# Patient Record
Sex: FEMALE | URBAN - METROPOLITAN AREA
[De-identification: names, ages, dates, MRNs, and addresses within clinical notes are randomized per-mention and may not be internally consistent; named-entity substitution may affect disease eponyms.]

---

## 2021-02-03 ENCOUNTER — APPOINTMENT (RX ONLY)
Dept: URBAN - METROPOLITAN AREA CLINIC 55 | Facility: CLINIC | Age: 71
Setting detail: DERMATOLOGY
End: 2021-02-03

## 2021-02-03 DIAGNOSIS — Z41.9 ENCOUNTER FOR PROCEDURE FOR PURPOSES OTHER THAN REMEDYING HEALTH STATE, UNSPECIFIED: ICD-10-CM

## 2021-02-03 PROCEDURE — ? DYSPORT

## 2021-02-03 NOTE — PROCEDURE: DYSPORT
Right Periorbital Skin Units: 0
Show Additional Area 1: Yes
Expiration Date (Month Year): 9/30/2021
Show Right And Left Pupillary Line Units: No
Post-Care Instructions: After care instructions were provided to the patient.
Consent: Written consent was obtained.
Additional Area 1 Location: chin
Dilution (U/0.1 Cc): 10
Glabellar Complex Units: 25
Periorbital Skin Units: 52.5
Price (Use Numbers Only, No Special Characters Or $): 398
Depressor Anguli Oris Units: 5
Lot #: [id number]
Detail Level: Simple

## 2021-02-19 ENCOUNTER — APPOINTMENT (RX ONLY)
Dept: URBAN - METROPOLITAN AREA CLINIC 55 | Facility: CLINIC | Age: 71
Setting detail: DERMATOLOGY
End: 2021-02-19

## 2021-02-19 DIAGNOSIS — Z41.9 ENCOUNTER FOR PROCEDURE FOR PURPOSES OTHER THAN REMEDYING HEALTH STATE, UNSPECIFIED: ICD-10-CM

## 2021-02-19 PROCEDURE — ? FILLERS

## 2021-02-19 NOTE — PROCEDURE: FILLERS
Jawline Filler Volume In Cc: 0
Lot #: [id number]
Include Cannula Information In Note?: Yes
Include Cannula Size?: 27G
Include Cannula Information In Note?: No
Anesthesia Type: 1% lidocaine with epinephrine
Anesthesia Volume In Cc: 0.5
Consent: Written consent was obtained.
Post-Care Instructions: After care instructions were provided to the patient.
Filler: Juvederm Ultra Plus XC
Expiration Date (Month Year): 8/6/2021
Detail Level: Simple
Map Statment: See Attach Map for Complete Details
Lot #: [id number]
Expiration Date (Month Year): 1/31/2022

## 2021-05-24 ENCOUNTER — APPOINTMENT (RX ONLY)
Dept: URBAN - METROPOLITAN AREA CLINIC 55 | Facility: CLINIC | Age: 71
Setting detail: DERMATOLOGY
End: 2021-05-24

## 2021-05-24 DIAGNOSIS — Z41.9 ENCOUNTER FOR PROCEDURE FOR PURPOSES OTHER THAN REMEDYING HEALTH STATE, UNSPECIFIED: ICD-10-CM

## 2021-05-24 PROCEDURE — ? DYSPORT

## 2021-05-24 NOTE — PROCEDURE: DYSPORT
Show Ucl Units: No
Show Lateral Platysmal Band Units: Yes
Additional Area 4 Units: 0
Periorbital Skin Units: 52.5
Depressor Anguli Oris Units: 5
Price (Use Numbers Only, No Special Characters Or $): 398
Additional Area 1 Location: chin
Lot #: [id number]
Post-Care Instructions: After care instructions were provided to the patient.
Dilution (U/0.1 Cc): 10
Glabellar Complex Units: 25
Consent: Written consent was obtained.
Expiration Date (Month Year): 01/2022
Detail Level: Simple

## 2021-06-30 ENCOUNTER — APPOINTMENT (RX ONLY)
Dept: URBAN - METROPOLITAN AREA CLINIC 55 | Facility: CLINIC | Age: 71
Setting detail: DERMATOLOGY
End: 2021-06-30

## 2021-06-30 DIAGNOSIS — Z41.9 ENCOUNTER FOR PROCEDURE FOR PURPOSES OTHER THAN REMEDYING HEALTH STATE, UNSPECIFIED: ICD-10-CM

## 2021-06-30 PROCEDURE — ? FILLERS

## 2021-06-30 NOTE — PROCEDURE: FILLERS
Include Cannula Information In Note?: Yes
Mid Face Filler Volume In Cc: 0
Include Cannula Size?: 27G
Filler: Juvederm Ultra Plus XC
Anesthesia Volume In Cc: 0.5
Lot #: [id number]
Map Statment: See Attach Map for Complete Details
Anesthesia Type: 1% lidocaine with epinephrine
Include Cannula Information In Note?: No
Post-Care Instructions: After care instructions were provided to the patient.
Consent: Written consent was obtained.
Expiration Date (Month Year): 04/2022
Detail Level: Simple

## 2021-09-22 ENCOUNTER — APPOINTMENT (RX ONLY)
Dept: URBAN - METROPOLITAN AREA CLINIC 55 | Facility: CLINIC | Age: 71
Setting detail: DERMATOLOGY
End: 2021-09-22

## 2021-09-22 DIAGNOSIS — Z41.9 ENCOUNTER FOR PROCEDURE FOR PURPOSES OTHER THAN REMEDYING HEALTH STATE, UNSPECIFIED: ICD-10-CM

## 2021-09-22 PROCEDURE — ? DYSPORT

## 2021-09-22 NOTE — PROCEDURE: DYSPORT
Show Glabellar Units: Yes
Right Periorbital Skin Units: 0
Detail Level: Detailed
Expiration Date (Month Year): 4/30/2022
Glabellar Complex Units: 35
Consent: Written consent obtained. Risks include but not limited to lid/brow ptosis, bruising, swelling, diplopia, temporary effect, incomplete chemical denervation.
Show Lcl Units: No
Depressor Anguli Oris Units: 5
Additional Area 1 Location: lip
Post-Care Instructions: Patient instructed to not lie down for 4 hours and limit physical activity for 24 hours. Follow up 7 days if needed.
Periorbital Skin Units: 52.5
Dilution (U/0.1 Cc): 10
Lot #: [id number]
Price (Use Numbers Only, No Special Characters Or $): 445

## 2021-11-04 ENCOUNTER — APPOINTMENT (RX ONLY)
Dept: URBAN - METROPOLITAN AREA CLINIC 55 | Facility: CLINIC | Age: 71
Setting detail: DERMATOLOGY
End: 2021-11-04

## 2021-11-04 DIAGNOSIS — Z41.9 ENCOUNTER FOR PROCEDURE FOR PURPOSES OTHER THAN REMEDYING HEALTH STATE, UNSPECIFIED: ICD-10-CM

## 2021-11-04 PROCEDURE — ? FILLERS

## 2021-11-04 NOTE — PROCEDURE: FILLERS
Include Cannula Information In Note?: No
Additional Area 1 Volume In Cc: 0
Include Cannula Size?: 27G
Filler: Juvederm Ultra Plus XC
Anesthesia Type: 1% lidocaine with epinephrine
Lot #: [id number]
Expiration Date (Month Year): 08/22
Post-Care Instructions: After care instructions were provided to the patient.
Consent: Written consent was obtained.
Include Cannula Information In Note?: Yes
Anesthesia Volume In Cc: 0.5
Map Statment: See Attach Map for Complete Details
Detail Level: Simple

## 2021-11-18 ENCOUNTER — APPOINTMENT (RX ONLY)
Dept: URBAN - METROPOLITAN AREA CLINIC 55 | Facility: CLINIC | Age: 71
Setting detail: DERMATOLOGY
End: 2021-11-18

## 2021-11-18 DIAGNOSIS — Z41.9 ENCOUNTER FOR PROCEDURE FOR PURPOSES OTHER THAN REMEDYING HEALTH STATE, UNSPECIFIED: ICD-10-CM

## 2021-11-18 PROCEDURE — ? FILLERS

## 2021-11-18 PROCEDURE — ? COSMETIC FOLLOW-UP

## 2021-11-18 NOTE — PROCEDURE: FILLERS
Nasolabial Folds Filler Volume In Cc: 0
Include Cannula Size?: 27G
Filler Comments: .5cc
Include Cannula Information In Note?: No
Expiration Date (Month Year): 1/24
Anesthesia Type: 1% lidocaine with epinephrine
Include Cannula Information In Note?: Yes
Filler: Restylane-L
Anesthesia Volume In Cc: 0.5
Map Statment: See Attach Map for Complete Details
Detail Level: Simple
Lot #: [id number]
Post-Care Instructions: After care instructions were provided to the patient.
Consent: Written consent was obtained.

## 2022-02-01 ENCOUNTER — APPOINTMENT (RX ONLY)
Dept: URBAN - METROPOLITAN AREA CLINIC 55 | Facility: CLINIC | Age: 72
Setting detail: DERMATOLOGY
End: 2022-02-01

## 2022-02-01 DIAGNOSIS — Z41.9 ENCOUNTER FOR PROCEDURE FOR PURPOSES OTHER THAN REMEDYING HEALTH STATE, UNSPECIFIED: ICD-10-CM

## 2022-02-01 PROCEDURE — ? DYSPORT

## 2022-02-01 NOTE — PROCEDURE: DYSPORT
Show Topical Anesthesia: Yes
Additional Area 1 Location: lip
Lot #: [id number]
Lcl Root Units: 0
Price (Use Numbers Only, No Special Characters Or $): 448
Show Lcl Units: No
Consent: Written consent obtained. Risks include but not limited to lid/brow ptosis, bruising, swelling, diplopia, temporary effect, incomplete chemical denervation.
Dilution (U/0.1 Cc): 10
Post-Care Instructions: Patient instructed to not lie down for 4 hours and limit physical activity for 24 hours. Follow up 7 days if needed.
Periorbital Skin Units: 52.5
Depressor Anguli Oris Units: 5
Expiration Date (Month Year): 10/31/2022
Glabellar Complex Units: 35
Detail Level: Detailed

## 2022-05-24 ENCOUNTER — APPOINTMENT (RX ONLY)
Dept: URBAN - METROPOLITAN AREA CLINIC 55 | Facility: CLINIC | Age: 72
Setting detail: DERMATOLOGY
End: 2022-05-24

## 2022-05-24 DIAGNOSIS — Z41.9 ENCOUNTER FOR PROCEDURE FOR PURPOSES OTHER THAN REMEDYING HEALTH STATE, UNSPECIFIED: ICD-10-CM

## 2022-05-24 PROCEDURE — ? DYSPORT

## 2022-05-24 NOTE — PROCEDURE: DYSPORT
Show Mentalis Units: No
Inferior Lateral Orbicularis Oculi Units: 0
Show Depressor Anguli Units: Yes
Periorbital Skin Units: 52.5
Additional Area 1 Location: lip
Price (Use Numbers Only, No Special Characters Or $): 445
Detail Level: Detailed
Consent: Written consent obtained. Risks include but not limited to lid/brow ptosis, bruising, swelling, diplopia, temporary effect, incomplete chemical denervation.
Depressor Anguli Oris Units: 10
Glabellar Complex Units: 35
Post-Care Instructions: Patient instructed to not lie down for 4 hours and limit physical activity for 24 hours. Follow up 7 days if needed.
Lot #: [id number]
Expiration Date (Month Year): 10/31/2022

## 2022-06-28 ENCOUNTER — APPOINTMENT (RX ONLY)
Dept: URBAN - METROPOLITAN AREA CLINIC 55 | Facility: CLINIC | Age: 72
Setting detail: DERMATOLOGY
End: 2022-06-28

## 2022-06-28 DIAGNOSIS — Z41.9 ENCOUNTER FOR PROCEDURE FOR PURPOSES OTHER THAN REMEDYING HEALTH STATE, UNSPECIFIED: ICD-10-CM

## 2022-06-28 PROCEDURE — ? FILLERS

## 2022-06-28 NOTE — PROCEDURE: FILLERS
Cheeks Filler Volume In Cc: 0
Include Documentation That Aspiration Was Performed Prior To Injecting Filler:: No
Consent: Written consent was obtained.
Expiration Date (Month Year): 11/22
Aspiration Statement: Aspiration was performed prior to injecting site with filler.
Lot #: [id number]
Include Cannula Size?: 27G
Detail Level: Simple
Filler: Juvederm Ultra Plus XC
Anesthesia Type: 1% lidocaine without epinephrine
Anesthesia Volume In Cc: 0.5
Include Cannula Information In Note?: Yes
Map Statment: See Attach Map for Complete Details
Post-Care Instructions: After care instructions were provided to the patient.

## 2022-10-07 ENCOUNTER — APPOINTMENT (RX ONLY)
Dept: URBAN - METROPOLITAN AREA CLINIC 55 | Facility: CLINIC | Age: 72
Setting detail: DERMATOLOGY
End: 2022-10-07

## 2022-10-07 DIAGNOSIS — Z41.9 ENCOUNTER FOR PROCEDURE FOR PURPOSES OTHER THAN REMEDYING HEALTH STATE, UNSPECIFIED: ICD-10-CM

## 2022-10-07 PROCEDURE — ? INVENTORY

## 2022-10-07 PROCEDURE — ? FILLERS

## 2022-10-07 PROCEDURE — ? DYSPORT

## 2022-10-07 NOTE — PROCEDURE: DYSPORT
Show Depressor Anguli Units: Yes
Post-Care Instructions: Patient instructed to not lie down for 4 hours and limit physical activity for 24 hours.
Masseter Units: 0
Show Right And Left Periorbital Units: No
Periorbital Skin Units: 52.5
Dilution (U/0.1 Cc): 10
Glabellar Complex Units: 35
Detail Level: Detailed
Consent was obtained.
Show Inventory Tab: Show

## 2022-10-07 NOTE — PROCEDURE: FILLERS
Mid Face Filler Volume In Cc: 0
Anesthesia Type: 1% lidocaine with epinephrine
Include Cannula Size?: 27G
Include Cannula Information In Note?: No
Anesthesia Volume In Cc: 0.5
Filler: Restylane-L
Filler Comments: .5cc
Detail Level: Detailed
Consent was obtained.
Post-Care Instructions: After care instructions were provided verbally and in writing.
Filler: Juvederm Ultra Plus XC
Map Statment: See Attach Map for Complete Details
Include Cannula Information In Note?: Yes

## 2023-01-18 ENCOUNTER — APPOINTMENT (RX ONLY)
Dept: URBAN - METROPOLITAN AREA CLINIC 55 | Facility: CLINIC | Age: 73
Setting detail: DERMATOLOGY
End: 2023-01-18

## 2023-01-18 DIAGNOSIS — Z41.9 ENCOUNTER FOR PROCEDURE FOR PURPOSES OTHER THAN REMEDYING HEALTH STATE, UNSPECIFIED: ICD-10-CM

## 2023-01-18 PROCEDURE — ? INVENTORY

## 2023-01-18 PROCEDURE — ? FILLERS

## 2023-01-18 PROCEDURE — ? DYSPORT

## 2023-01-18 PROCEDURE — ? HYALURONIDASE INJECTION

## 2023-01-18 ASSESSMENT — LOCATION SIMPLE DESCRIPTION DERM: LOCATION SIMPLE: RIGHT CHEEK

## 2023-01-18 ASSESSMENT — LOCATION ZONE DERM: LOCATION ZONE: FACE

## 2023-01-18 ASSESSMENT — LOCATION DETAILED DESCRIPTION DERM: LOCATION DETAILED: RIGHT CENTRAL MALAR CHEEK

## 2023-01-18 NOTE — PROCEDURE: HYALURONIDASE INJECTION
Detail Level: Detailed
Consent: The risks of contour defects and dimpling of the skin were reviewed with the patient prior to the injection.
Hyaluronidase Preparation: hyaluronidase
Total Volume (Ccs): 0.2

## 2023-01-18 NOTE — PROCEDURE: FILLERS
Use Map Statement For Sites (Optional): No
Additional Area 5 Volume In Cc: 0
Include Cannula Information In Note?: Yes
Map Statment: See Attach Map for Complete Details
Filler: Juvederm Ultra Plus XC
Include Cannula Size?: 27G
Anesthesia Type: 1% lidocaine with epinephrine
Consent was obtained.
Post-Care Instructions: After care instructions were provided verbally and in writing.
Anesthesia Volume In Cc: 0.5
Filler: Restylane-L
Detail Level: Detailed
Filler Comments: .5 cc

## 2023-01-18 NOTE — PROCEDURE: DYSPORT
Show Additional Area 6: Yes
Consent was obtained.
Show Ucl Units: No
Right Pupillary Line Units: 0
Detail Level: Detailed
Glabellar Complex Units: 35
Post-Care Instructions: Patient instructed to not lie down for 4 hours and limit physical activity for 24 hours.
Dilution (U/0.1 Cc): 10
Periorbital Skin Units: 52.5
Show Inventory Tab: Show

## 2023-01-26 ENCOUNTER — APPOINTMENT (RX ONLY)
Dept: URBAN - METROPOLITAN AREA CLINIC 55 | Facility: CLINIC | Age: 73
Setting detail: DERMATOLOGY
End: 2023-01-26

## 2023-01-26 DIAGNOSIS — Z41.9 ENCOUNTER FOR PROCEDURE FOR PURPOSES OTHER THAN REMEDYING HEALTH STATE, UNSPECIFIED: ICD-10-CM

## 2023-01-26 PROCEDURE — ? INVENTORY

## 2023-01-26 PROCEDURE — ? COSMETIC FOLLOW-UP

## 2023-05-04 ENCOUNTER — APPOINTMENT (RX ONLY)
Dept: URBAN - METROPOLITAN AREA CLINIC 55 | Facility: CLINIC | Age: 73
Setting detail: DERMATOLOGY
End: 2023-05-04

## 2023-05-04 DIAGNOSIS — Z41.9 ENCOUNTER FOR PROCEDURE FOR PURPOSES OTHER THAN REMEDYING HEALTH STATE, UNSPECIFIED: ICD-10-CM

## 2023-05-04 PROCEDURE — ? DYSPORT

## 2023-05-04 NOTE — PROCEDURE: DYSPORT
Show Masseter Units: Yes
Post-Care Instructions: Patient instructed to not lie down for 4 hours and limit physical activity for 24 hours.
Show Right And Left Periorbital Units: No
Periorbital Skin Units: 52.5
Masseter Units: 0
Dilution (U/0.1 Cc): 10
Detail Level: Detailed
Glabellar Complex Units: 40
Show Inventory Tab: Show
Consent was obtained.

## 2023-09-12 ENCOUNTER — APPOINTMENT (RX ONLY)
Dept: URBAN - METROPOLITAN AREA CLINIC 55 | Facility: CLINIC | Age: 73
Setting detail: DERMATOLOGY
End: 2023-09-12

## 2023-09-12 DIAGNOSIS — Z41.9 ENCOUNTER FOR PROCEDURE FOR PURPOSES OTHER THAN REMEDYING HEALTH STATE, UNSPECIFIED: ICD-10-CM

## 2023-09-12 PROCEDURE — ? INVENTORY

## 2023-09-12 PROCEDURE — ? DYSPORT

## 2023-09-12 PROCEDURE — ? COSMETIC CONSULTATION: GENERAL

## 2023-09-12 NOTE — PROCEDURE: DYSPORT
Lcl Root Units: 0
Show Lcl Units: No
Show Additional Area 2: Yes
Glabellar Complex Units: 40
Additional Area 1 Location: upper cutaneous
Additional Area 2 Location: [facility] [place]
Detail Level: Detailed
Consent obtained and risk reviewed.
Depressor Anguli Oris Units: 10
Show Inventory Tab: Show
Post-Care Instructions: Patient instructed to not lie down flat for 4 hours or rub the treatment area.
Periorbital Skin Units: 52.5

## 2023-10-02 ENCOUNTER — APPOINTMENT (RX ONLY)
Dept: URBAN - METROPOLITAN AREA CLINIC 55 | Facility: CLINIC | Age: 73
Setting detail: DERMATOLOGY
End: 2023-10-02

## 2023-10-02 DIAGNOSIS — Z41.9 ENCOUNTER FOR PROCEDURE FOR PURPOSES OTHER THAN REMEDYING HEALTH STATE, UNSPECIFIED: ICD-10-CM

## 2023-10-02 PROCEDURE — ? DYSPORT

## 2023-10-02 PROCEDURE — ? INVENTORY

## 2023-10-02 NOTE — PROCEDURE: DYSPORT
Right Pupillary Line Units: 0
Show Levator Superior Units: Yes
Show Ucl Units: No
Show Inventory Tab: Show
Additional Area 2 Location: [facility]
Post-Care Instructions: Patient instructed to not lie down flat for 4 hours or rub the treatment area.
Consent obtained and risk reviewed.
Dilution (U/0.1 Cc): 10
Forehead Units: 5
Additional Area 1 Location: upper cutaneous
Detail Level: Detailed

## 2024-01-25 ENCOUNTER — APPOINTMENT (RX ONLY)
Dept: URBAN - METROPOLITAN AREA CLINIC 55 | Facility: CLINIC | Age: 74
Setting detail: DERMATOLOGY
End: 2024-01-25

## 2024-01-25 DIAGNOSIS — Z41.9 ENCOUNTER FOR PROCEDURE FOR PURPOSES OTHER THAN REMEDYING HEALTH STATE, UNSPECIFIED: ICD-10-CM

## 2024-01-25 PROCEDURE — ? FILLERS

## 2024-01-25 PROCEDURE — ? INVENTORY

## 2024-01-25 PROCEDURE — ? DYSPORT

## 2024-01-25 NOTE — PROCEDURE: FILLERS
Cheeks Filler Volume In Cc: 0
Include Documentation That Aspiration Was Performed Prior To Injecting Filler:: No
Include Cannula Size?: 27G
Anesthesia Type: 1% lidocaine with epinephrine
Anesthesia Volume In Cc: 0.5
Include Cannula Information In Note?: Yes
Consent was obtained.
Detail Level: Detailed
Post-Care Instructions: After care instructions were provided verbally and in writing.
Filler: Restylane Silk
Map Statment: See Attach Map for Complete Details

## 2024-01-25 NOTE — PROCEDURE: DYSPORT
Show Additional Area 3: Yes
Left Periorbital Skin Units: 0
Show Right And Left Pupillary Line Units: No
Forehead Units: 5
Detail Level: Detailed
Glabellar Complex Units: 35
Depressor Anguli Oris Units: 10
Show Inventory Tab: Show
Consent was obtained.
Periorbital Skin Units: 52.5
Post-Care Instructions: Patient instructed to not lie down for 4 hours and limit physical activity for 24 hours.

## 2024-05-16 ENCOUNTER — APPOINTMENT (RX ONLY)
Dept: URBAN - METROPOLITAN AREA CLINIC 55 | Facility: CLINIC | Age: 74
Setting detail: DERMATOLOGY
End: 2024-05-16

## 2024-05-16 DIAGNOSIS — Z41.9 ENCOUNTER FOR PROCEDURE FOR PURPOSES OTHER THAN REMEDYING HEALTH STATE, UNSPECIFIED: ICD-10-CM

## 2024-05-16 PROCEDURE — ? INVENTORY

## 2024-05-16 PROCEDURE — ? FILLERS

## 2024-05-16 PROCEDURE — ? DYSPORT

## 2024-05-16 ASSESSMENT — LOCATION SIMPLE DESCRIPTION DERM: LOCATION SIMPLE: SCALP

## 2024-05-16 ASSESSMENT — LOCATION DETAILED DESCRIPTION DERM: LOCATION DETAILED: LEFT CENTRAL OCCIPITAL SCALP

## 2024-05-16 ASSESSMENT — LOCATION ZONE DERM: LOCATION ZONE: SCALP

## 2024-05-16 NOTE — PROCEDURE: FILLERS
Use Map Statement For Sites (Optional): No
Additional Area 3 Volume In Cc: 0
Filler: Juvederm Ultra Plus XC
Map Statment: See Attach Map for Complete Details
Include Cannula Information In Note?: Yes
Include Cannula Size?: 27G
Anesthesia Type: 1% lidocaine with epinephrine
Anesthesia Volume In Cc: 0.5
Consent was obtained.
Post-Care Instructions: After care instructions were provided verbally and in writing.
Detail Level: Detailed

## 2024-05-16 NOTE — PROCEDURE: DYSPORT
Additional Area 5 Units: 0
Forehead Units: 5
Additional Area 1 Location: Chin
Show Additional Area 5: Yes
Show Right And Left Brow Units: No
Glabellar Complex Units: 35
Detail Level: Detailed
Consent was obtained.
Post-Care Instructions: Patient instructed to not lie down for 4 hours and limit physical activity for 24 hours.
Show Inventory Tab: Show
Periorbital Skin Units: 52.5
Dilution (U/0.1 Cc): 10

## 2024-12-12 ENCOUNTER — APPOINTMENT (OUTPATIENT)
Dept: URBAN - METROPOLITAN AREA CLINIC 55 | Facility: CLINIC | Age: 74
Setting detail: DERMATOLOGY
End: 2024-12-12

## 2024-12-12 DIAGNOSIS — Z41.9 ENCOUNTER FOR PROCEDURE FOR PURPOSES OTHER THAN REMEDYING HEALTH STATE, UNSPECIFIED: ICD-10-CM

## 2024-12-12 PROCEDURE — ? DYSPORT

## 2024-12-12 NOTE — PROCEDURE: DYSPORT
Show Topical Anesthesia: Yes
Lcl Root Units: 0
Show Lcl Units: No
Periorbital Skin Units: 52.5
Glabellar Complex Units: 35
Additional Area 1 Location: Chin
Depressor Anguli Oris Units: 10
Forehead Units: 5
Detail Level: Detailed
Consent was obtained.
Show Inventory Tab: Show
Post-Care Instructions: Patient instructed to not lie down for 4 hours and limit physical activity for 24 hours.

## 2025-02-04 ENCOUNTER — APPOINTMENT (OUTPATIENT)
Dept: URBAN - METROPOLITAN AREA CLINIC 55 | Facility: CLINIC | Age: 75
Setting detail: DERMATOLOGY
End: 2025-02-04

## 2025-02-04 DIAGNOSIS — Z41.9 ENCOUNTER FOR PROCEDURE FOR PURPOSES OTHER THAN REMEDYING HEALTH STATE, UNSPECIFIED: ICD-10-CM

## 2025-02-04 PROCEDURE — ? INVENTORY

## 2025-02-04 PROCEDURE — ? FILLERS

## 2025-02-04 NOTE — PROCEDURE: FILLERS
Additional Area 5 Volume In Cc: 0
Map Statment: See Attach Map for Complete Details
Include Cannula Information In Note?: No
Include Cannula Information In Note?: Yes
Anesthesia Volume In Cc: 0.5
Anesthesia Type: 1% lidocaine with epinephrine
Include Cannula Size?: 27G
Filler: Restylane Eyelight
Detail Level: Detailed
Filler: Juvederm Ultra Plus XC
Consent was obtained.
Post-Care Instructions: After care instructions were provided verbally and in writing.

## 2025-05-06 ENCOUNTER — APPOINTMENT (OUTPATIENT)
Dept: URBAN - METROPOLITAN AREA CLINIC 55 | Facility: CLINIC | Age: 75
Setting detail: DERMATOLOGY
End: 2025-05-06

## 2025-05-06 DIAGNOSIS — Z41.9 ENCOUNTER FOR PROCEDURE FOR PURPOSES OTHER THAN REMEDYING HEALTH STATE, UNSPECIFIED: ICD-10-CM

## 2025-05-06 PROCEDURE — ? DYSPORT

## 2025-05-06 NOTE — PROCEDURE: DYSPORT
Show Additional Area 4: Yes
Left Periorbital Skin Units: 0
Show Right And Left Pupillary Line Units: No
Forehead Units: 5
Additional Area 1 Location: upper lip cutaneous
Detail Level: Detailed
Glabellar Complex Units: 35
Depressor Anguli Oris Units: 10
Consent obtained and risk reviewed.
Show Inventory Tab: Show
Additional Area 2 Location: chin
Periorbital Skin Units: 57.5
Post-Care Instructions: Patient instructed to not lie down flat for 4 hours or rub the treatment area.

## 2025-07-08 ENCOUNTER — APPOINTMENT (OUTPATIENT)
Dept: URBAN - METROPOLITAN AREA CLINIC 55 | Facility: CLINIC | Age: 75
Setting detail: DERMATOLOGY
End: 2025-07-08

## 2025-07-08 DIAGNOSIS — Z41.9 ENCOUNTER FOR PROCEDURE FOR PURPOSES OTHER THAN REMEDYING HEALTH STATE, UNSPECIFIED: ICD-10-CM

## 2025-07-08 PROCEDURE — ? INVENTORY

## 2025-07-08 PROCEDURE — ? FILLERS

## 2025-07-08 NOTE — PROCEDURE: FILLERS
Filler: Juvederm Ultra Plus XC
Include Cannula Information In Note?: No
Vermilion Lips Filler Volume In Cc: 0
Post-Care Instructions: After care instructions were provided verbally and in writing.
Include Cannula Size?: 27G
Map Statment: See Attach Map for Complete Details
Anesthesia Type: 1% lidocaine with epinephrine
Include Cannula Information In Note?: Yes
Anesthesia Volume In Cc: 0.5
Detail Level: Detailed
Consent was obtained.

## 2025-08-26 ENCOUNTER — APPOINTMENT (OUTPATIENT)
Dept: URBAN - METROPOLITAN AREA CLINIC 55 | Facility: CLINIC | Age: 75
Setting detail: DERMATOLOGY
End: 2025-08-26

## 2025-08-26 DIAGNOSIS — Z41.9 ENCOUNTER FOR PROCEDURE FOR PURPOSES OTHER THAN REMEDYING HEALTH STATE, UNSPECIFIED: ICD-10-CM

## 2025-08-26 PROCEDURE — ? DYSPORT
